# Patient Record
Sex: FEMALE | Race: WHITE | Employment: OTHER | URBAN - NONMETROPOLITAN AREA
[De-identification: names, ages, dates, MRNs, and addresses within clinical notes are randomized per-mention and may not be internally consistent; named-entity substitution may affect disease eponyms.]

---

## 2020-07-04 ENCOUNTER — APPOINTMENT (OUTPATIENT)
Dept: GENERAL RADIOLOGY | Facility: OTHER | Age: 60
End: 2020-07-04
Attending: EMERGENCY MEDICINE
Payer: COMMERCIAL

## 2020-07-04 ENCOUNTER — HOSPITAL ENCOUNTER (EMERGENCY)
Facility: OTHER | Age: 60
Discharge: HOME | End: 2020-07-04
Attending: EMERGENCY MEDICINE | Admitting: EMERGENCY MEDICINE
Payer: COMMERCIAL

## 2020-07-04 VITALS
OXYGEN SATURATION: 98 % | SYSTOLIC BLOOD PRESSURE: 135 MMHG | DIASTOLIC BLOOD PRESSURE: 68 MMHG | TEMPERATURE: 97.3 F | WEIGHT: 175 LBS | BODY MASS INDEX: 28.12 KG/M2 | HEIGHT: 66 IN

## 2020-07-04 DIAGNOSIS — S92.255A CLOSED NONDISPLACED FRACTURE OF NAVICULAR BONE OF LEFT FOOT, INITIAL ENCOUNTER: ICD-10-CM

## 2020-07-04 DIAGNOSIS — S92.025A CLOSED NONDISPLACED FRACTURE OF ANTERIOR PROCESS OF LEFT CALCANEUS, INITIAL ENCOUNTER: ICD-10-CM

## 2020-07-04 PROCEDURE — 29515 APPLICATION SHORT LEG SPLINT: CPT | Performed by: EMERGENCY MEDICINE

## 2020-07-04 PROCEDURE — 99283 EMERGENCY DEPT VISIT LOW MDM: CPT | Mod: 25 | Performed by: EMERGENCY MEDICINE

## 2020-07-04 PROCEDURE — 73610 X-RAY EXAM OF ANKLE: CPT | Mod: LT

## 2020-07-04 PROCEDURE — 99283 EMERGENCY DEPT VISIT LOW MDM: CPT | Mod: Z6 | Performed by: EMERGENCY MEDICINE

## 2020-07-04 PROCEDURE — 29515 APPLICATION SHORT LEG SPLINT: CPT | Mod: Z6 | Performed by: EMERGENCY MEDICINE

## 2020-07-04 PROCEDURE — 40000986 XR ANKLE PORT LT G/E 3 VW: Mod: LT

## 2020-07-04 PROCEDURE — 73630 X-RAY EXAM OF FOOT: CPT | Mod: LT

## 2020-07-04 ASSESSMENT — MIFFLIN-ST. JEOR: SCORE: 1370.54

## 2020-07-04 NOTE — ED AVS SNAPSHOT
[facility]  [address]  [place] [address]  Phone:  [phone]  Fax:  [phone]                                    Linda Montaño   MRN: [id number]    Department:  [facility]   Date of Visit:  7/4/2020           After Visit Summary Signature Page    I have received my discharge instructions, and my questions have been answered. I have discussed any challenges I see with this plan with the nurse or doctor.    ..........................................................................................................................................  Patient/Patient Representative Signature      ..........................................................................................................................................  Patient Representative Print Name and Relationship to Patient    ..................................................               ................................................  Date                                   Time    ..........................................................................................................................................  Reviewed by Signature/Title    ...................................................              ..............................................  Date                                               Time          22EPIC Rev 08/18

## 2020-07-05 NOTE — ED TRIAGE NOTES
Patient was out side weeding her garden.  There was a hole that she did not see.  See stepped into the hole and rolled her left ankle.  Injury occurred about 4 hours ago.  She is complaining of pain in the arch of her left foot.  Rates pain 8/10 when ambulating.  Describes the pain as aching and throbbing.    Cynthia Moeller RN on 7/4/2020 at 7:07 PM

## 2020-07-05 NOTE — ED PROVIDER NOTES
Orthopedic fracture care note    After verbal informed consent was obtained, the patient was placed in a left sided short leg splint using Ortho-Glass.  There was a little bit of wrinkling of the Orthoplast at the ankle.  I did place some padding underneath this and discussed this site in particular with the patient    Carlin Isabel MD  Internal Medicine and Emergency Medicine  4:29 AM 07/05/20      Carlin Isabel MD  07/05/20 0429

## 2020-07-05 NOTE — ED PROVIDER NOTES
"Linda Montaño  : 1960 Age: 60 year old Sex: female MRN: [id number]    CC: Ankle and foot pain    HPI: Linda Montaño is a 60 year old female with no contributory medical history who presents with left ankle and arch foot pain after she was out gardening today and stepped into a hole twisting her left ankle.  She was able to walk on the ankle immediately afterwards but is since developed significant pain.  She attempted icing at home without relief and presents here for evaluation.  Denies any injury, denies any proximal fibular tenderness.    ED Course and MDM:  Ankle foot pain: Swollen over lateral arch of foot.  Plan for x-ray of ankle and foot.  No proximal fibular tenderness, no x-ray of tib/fib.   X-ray demonstrated avulsion fracture of the calcaneus and navicular.  Patient was placed in a short leg splint and discharged home in stable condition with orthopedic follow-up.  Discussed splint care with the patient    Final Clinical Impression:  Calcaneus fracture and navicular fracture    Carlin Isabel MD  Internal Medicine and Emergency Medicine  7:33 PM 20      Physical Exam:  /68   Temp 97.3  F (36.3  C) (Temporal)   Ht 1.676 m (5' 6\")   Wt 79.4 kg (175 lb)   SpO2 98%   BMI 28.25 kg/m      Gen: Awake, alert, NAD  Ext: L foot with swelling over lateral arch of foot. No proximal 5th metatarsal tenderness, no ankle tenderness. Decreased ROM    ROS:  Focused ROS performed and noted in HPI             Carlin Isabel MD  20 0428    "

## 2020-07-05 NOTE — ED NOTES
Patient roomed into room 907.  Was given an [facility] Directive to complete and instructions were given.  All questions answerred at this time.  Call light is within reach of patient.  Non other needs at this time.    Cynthia Moeller RN on 7/4/2020 at 7:21 PM

## 2020-07-05 NOTE — ED NOTES
Splinting to LLE by MD. CMS unchanged LLE after splint application. Crutches provided, fit to patient. Crutch instruction completed with successful return demo by patient.

## 2020-08-25 ENCOUNTER — ALLIED HEALTH/NURSE VISIT (OUTPATIENT)
Dept: FAMILY MEDICINE | Facility: OTHER | Age: 60
End: 2020-08-25
Attending: FAMILY MEDICINE
Payer: COMMERCIAL

## 2020-08-25 DIAGNOSIS — R05.9 COUGH: Primary | ICD-10-CM

## 2020-08-25 PROCEDURE — 99207 ZZC NO CHARGE NURSE ONLY: CPT

## 2020-08-25 PROCEDURE — U0003 INFECTIOUS AGENT DETECTION BY NUCLEIC ACID (DNA OR RNA); SEVERE ACUTE RESPIRATORY SYNDROME CORONAVIRUS 2 (SARS-COV-2) (CORONAVIRUS DISEASE [COVID-19]), AMPLIFIED PROBE TECHNIQUE, MAKING USE OF HIGH THROUGHPUT TECHNOLOGIES AS DESCRIBED BY CMS-2020-01-R: HCPCS | Mod: ZL | Performed by: FAMILY MEDICINE

## 2020-08-25 PROCEDURE — C9803 HOPD COVID-19 SPEC COLLECT: HCPCS

## 2020-08-26 LAB
SARS-COV-2 RNA SPEC QL NAA+PROBE: ABNORMAL
SPECIMEN SOURCE: ABNORMAL

## 2020-08-27 ENCOUNTER — TELEPHONE (OUTPATIENT)
Dept: LAB | Facility: HOSPITAL | Age: 60
End: 2020-08-27

## 2020-08-27 NOTE — TELEPHONE ENCOUNTER
Writer called to notify patient of positive Covid test.  Pt extremely upset about the lack of consistency between [facility] and [facility] as far as protocols. Pt states she will be reporting [facility] for poor care.     Pt is aware of her Covid positive status.

## 2021-01-03 ENCOUNTER — HEALTH MAINTENANCE LETTER (OUTPATIENT)
Age: 61
End: 2021-01-03

## 2021-04-26 ENCOUNTER — ALLIED HEALTH/NURSE VISIT (OUTPATIENT)
Dept: FAMILY MEDICINE | Facility: OTHER | Age: 61
End: 2021-04-26
Attending: FAMILY MEDICINE
Payer: COMMERCIAL

## 2021-04-26 DIAGNOSIS — Z20.822 EXPOSURE TO 2019 NOVEL CORONAVIRUS: Primary | ICD-10-CM

## 2021-04-26 DIAGNOSIS — R05.9 COUGH: ICD-10-CM

## 2021-04-26 LAB
SARS-COV-2 RNA RESP QL NAA+PROBE: NORMAL
SPECIMEN SOURCE: NORMAL

## 2021-04-26 PROCEDURE — C9803 HOPD COVID-19 SPEC COLLECT: HCPCS

## 2021-04-26 PROCEDURE — U0003 INFECTIOUS AGENT DETECTION BY NUCLEIC ACID (DNA OR RNA); SEVERE ACUTE RESPIRATORY SYNDROME CORONAVIRUS 2 (SARS-COV-2) (CORONAVIRUS DISEASE [COVID-19]), AMPLIFIED PROBE TECHNIQUE, MAKING USE OF HIGH THROUGHPUT TECHNOLOGIES AS DESCRIBED BY CMS-2020-01-R: HCPCS | Mod: ZL | Performed by: FAMILY MEDICINE

## 2021-04-26 PROCEDURE — U0005 INFEC AGEN DETEC AMPLI PROBE: HCPCS | Mod: ZL | Performed by: FAMILY MEDICINE

## 2021-04-27 LAB
LABORATORY COMMENT REPORT: NORMAL
SARS-COV-2 RNA RESP QL NAA+PROBE: NEGATIVE
SPECIMEN SOURCE: NORMAL

## 2021-07-26 ENCOUNTER — IMMUNIZATION (OUTPATIENT)
Dept: FAMILY MEDICINE | Facility: OTHER | Age: 61
End: 2021-07-26
Attending: FAMILY MEDICINE
Payer: COMMERCIAL

## 2021-07-26 PROCEDURE — 91300 PR COVID VAC PFIZER DIL RECON 30 MCG/0.3 ML IM: CPT

## 2021-07-29 ENCOUNTER — DOCUMENTATION ONLY (OUTPATIENT)
Dept: OTHER | Facility: CLINIC | Age: 61
End: 2021-07-29

## 2021-08-16 ENCOUNTER — IMMUNIZATION (OUTPATIENT)
Dept: FAMILY MEDICINE | Facility: OTHER | Age: 61
End: 2021-08-16
Attending: FAMILY MEDICINE
Payer: COMMERCIAL

## 2021-08-16 PROCEDURE — 0002A PR COVID VAC PFIZER DIL RECON 30 MCG/0.3 ML IM: CPT
